# Patient Record
Sex: MALE | Race: BLACK OR AFRICAN AMERICAN | NOT HISPANIC OR LATINO | Employment: OTHER | URBAN - METROPOLITAN AREA
[De-identification: names, ages, dates, MRNs, and addresses within clinical notes are randomized per-mention and may not be internally consistent; named-entity substitution may affect disease eponyms.]

---

## 2018-01-11 ENCOUNTER — DOCUMENTATION ONLY (OUTPATIENT)
Dept: FAMILY MEDICINE | Facility: CLINIC | Age: 38
End: 2018-01-11

## 2018-01-11 NOTE — PROGRESS NOTES
Pre-Visit Chart Review  For Appointment Scheduled on (1/11/18)    Health Maintenance Due   Topic Date Due    Lipid Panel  1979    TETANUS VACCINE  12/13/1997    Influenza Vaccine  08/01/2017

## 2018-01-15 ENCOUNTER — OFFICE VISIT (OUTPATIENT)
Dept: FAMILY MEDICINE | Facility: CLINIC | Age: 38
End: 2018-01-15
Payer: COMMERCIAL

## 2018-01-15 VITALS
DIASTOLIC BLOOD PRESSURE: 101 MMHG | TEMPERATURE: 99 F | HEART RATE: 86 BPM | HEIGHT: 78 IN | BODY MASS INDEX: 36.45 KG/M2 | SYSTOLIC BLOOD PRESSURE: 144 MMHG | WEIGHT: 315 LBS

## 2018-01-15 DIAGNOSIS — I10 BENIGN ESSENTIAL HTN: ICD-10-CM

## 2018-01-15 DIAGNOSIS — Z76.89 ESTABLISHING CARE WITH NEW DOCTOR, ENCOUNTER FOR: Primary | ICD-10-CM

## 2018-01-15 PROBLEM — R03.0 ELEVATED BLOOD PRESSURE READING WITHOUT DIAGNOSIS OF HYPERTENSION: Status: ACTIVE | Noted: 2018-01-15

## 2018-01-15 PROBLEM — R03.0 ELEVATED BLOOD PRESSURE READING WITHOUT DIAGNOSIS OF HYPERTENSION: Status: RESOLVED | Noted: 2018-01-15 | Resolved: 2018-01-15

## 2018-01-15 PROCEDURE — 99999 PR PBB SHADOW E&M-EST. PATIENT-LVL III: CPT | Mod: PBBFAC,,, | Performed by: FAMILY MEDICINE

## 2018-01-15 PROCEDURE — 99202 OFFICE O/P NEW SF 15 MIN: CPT | Mod: S$GLB,,, | Performed by: FAMILY MEDICINE

## 2018-01-15 RX ORDER — LISINOPRIL 20 MG/1
20 TABLET ORAL DAILY
Qty: 30 TABLET | Refills: 3 | Status: SHIPPED | OUTPATIENT
Start: 2018-01-15 | End: 2018-04-19 | Stop reason: SDUPTHER

## 2018-01-15 NOTE — MEDICAL/APP STUDENT
Subjective:       Patient ID: Will Christy is a 37 y.o. male.     Chief Complaint: Establish Care     HPI      Establish Care  Pt reports to the clinic to establish care.     Pt concerned about family hx of DM and HTN. Would like to have these checked. Also reports snoring and daytime sleepiness, concerned about sleep apnea.    The pt has no medical history and is not on any medications.  As far as smoking is concerned, the pt smokes 1/2 pack a day for the last 10 years. Pt has never tried quitting but is interested. Pt drinks 4oz hard liquor every day. Pt does not use illicit drugs.  The pt is overweight, does not exercise and does not diet. Does not eat breakfast and eats fast food 3x a week.  Does not wear a seatbelt regularly.   Pt is unsure of depressed mood, but states no other symptom of depression. Denies suicidal or homicidal ideations.     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  Health maintenance addressed and updated in chart. Has not had lipid/blood glucose checked. Has had tetanus shot recently for employer.     Review of Systems   Constitutional: Negative for chills and fever.   HENT: Negative for sore throat.    Respiratory: Negative for cough and shortness of breath.  Positive for nasal congestion.  Cardiovascular: Negative for chest pain and leg swelling.   Gastrointestinal: Negative for abdominal pain. Negative for blood in stool, constipation and diarrhea.   Genitourinary: Negative for difficulty urinating, dysuria and hematuria.   Musculoskeletal: Negative for arthralgias.   Neurological: Negative for dizziness and weakness.       Objective:   Physical Exam   Constitutional: He appears well-developed and well-nourished. No distress. Obese  HENT:   Head: Normocephalic and atraumatic.   Mouth/Throat: Oropharynx is clear and moist. No oropharyngeal exudate.   Eyes: EOM are normal. Pupils are equal, round, and reactive to light.   Neck: Normal range of motion. Neck supple. No  thyromegaly present.   Cardiovascular: Normal rate, regular rhythm, normal heart sounds and intact distal pulses.    Pulmonary/Chest: Effort normal and breath sounds normal. No respiratory distress. He has no wheezes.   Abdominal: Soft. Bowel sounds are normal. He exhibits no distension and no mass. There is no tenderness.   Musculoskeletal: He exhibits no edema.   Lymphadenopathy:     He has no cervical adenopathy.   Neurological: He is alert.   Skin: Skin is warm. No rash noted. No erythema.   Psychiatric: He has a normal mood and affect. His behavior is normal.   Vitals reviewed.      Assessment:

## 2018-01-15 NOTE — PROGRESS NOTES
Subjective:       Patient ID: Will Christy is a 37 y.o. male.    Chief Complaint: Annual Exam    HPI     Establish Care  Pt reports to the clinic to establish care.      Pt concerned about family hx of DM and HTN. Would like to have these checked. Also reports snoring and daytime sleepiness, concerned about sleep apnea.     The pt has no medical history and is not on any medications.  As far as smoking is concerned, the pt smokes 1/2 pack a day for the last 10 years. Pt has never tried quitting but is interested. Pt drinks 4oz hard liquor every day. Pt does not use illicit drugs.  The pt is overweight, does not exercise and does not diet. Does not eat breakfast and eats fast food 3x a week.  Does not wear a seatbelt regularly.   Pt is unsure of depressed mood, but states no other symptom of depression. Denies suicidal or homicidal ideations.      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  Health maintenance addressed and updated in chart. Has not had lipid/blood glucose checked. Has had tetanus shot recently for employer.    Review of Systems   Constitutional: Negative for chills and fever.   Respiratory: Negative for chest tightness and shortness of breath.    Cardiovascular: Negative for chest pain and leg swelling.   Gastrointestinal: Negative for abdominal pain, constipation, diarrhea and vomiting.   Genitourinary: Negative for decreased urine volume, dysuria and hematuria.   Musculoskeletal: Negative for arthralgias and back pain.   Neurological: Negative for weakness and light-headedness.       Objective:      Physical Exam   Constitutional: He appears well-developed and well-nourished. No distress.   Obese male in no acute distress.     HENT:   Head: Normocephalic and atraumatic.   Mouth/Throat: Oropharynx is clear and moist. No oropharyngeal exudate.   Eyes: EOM are normal. Pupils are equal, round, and reactive to light.   Neck: Normal range of motion. Neck supple. No thyromegaly  present.   Cardiovascular: Normal rate, regular rhythm, normal heart sounds and intact distal pulses.    Pulmonary/Chest: Effort normal and breath sounds normal. No respiratory distress. He has no wheezes.   Abdominal: Soft. Bowel sounds are normal. He exhibits no distension and no mass. There is no tenderness.   Musculoskeletal: He exhibits no edema.   Lymphadenopathy:     He has no cervical adenopathy.   Neurological: He is alert.   Skin: Skin is warm. No rash noted. No erythema.   Psychiatric: He has a normal mood and affect. His behavior is normal.   Vitals reviewed.      Assessment:       1. Establishing care with new doctor, encounter for    2. Benign essential HTN        Plan:       1. Establishing care with new doctor, encounter for    2. Benign essential HTN  Start lisinopril on today.   Advised of potential side effects/adverse reactions associated with this medications.   Monitor home BP.   Na+ restrictions.   Weight loss.   - lisinopril (PRINIVIL,ZESTRIL) 20 MG tablet; Take 1 tablet (20 mg total) by mouth once daily.  Dispense: 30 tablet; Refill: 3    Patient readiness: acceptance and barriers:none    During the course of the visit the patient was educated and counseled about the following:     Hypertension:   Dietary sodium restriction.  Regular aerobic exercise.  Obesity:   Informal exercise measures discussed, e.g. taking stairs instead of elevator.  Regular aerobic exercise program discussed.    Goals: Hypertension: Reduce Blood Pressure and Obesity: Reduce calorie intake and BMI    Did patient meet goals/outcomes: No    The following self management tools provided: blood pressure log  excercise log    Patient Instructions (the written plan) was given to the patient/family.     Time spent with patient: 15 minutes    Portions of this note were created using Dragon voice recognition software. There may be voice recognition errors found in the text, and attempts were made to correct these errors prior  to kassandra Tolbert MD    Family Medicine  1/15/2018

## 2018-01-15 NOTE — PATIENT INSTRUCTIONS
Controlling High Blood Pressure  High blood pressure (hypertension) is often called the silent killer. This is because many people who have it dont know it. High blood pressure is defined as 140/90 mm Hg or higher. Know your blood pressure and remember to check it regularly. Doing so can save your life. Here are some things you can do to help control your blood pressure.    Choose heart-healthy foods  · Select low-salt, low-fat foods. Limit sodium intake to 2,400 mg per day or the amount suggested by your healthcare provider.  · Limit canned, dried, cured, packaged, and fast foods. These can contain a lot of salt.  · Eat 8 to 10 servings of fruits and vegetables every day.  · Choose lean meats, fish, or chicken.  · Eat whole-grain pasta, brown rice, and beans.  · Eat 2 to 3 servings of low-fat or fat-free dairy products.  · Ask your doctor about the DASH eating plan. This plan helps reduce blood pressure.  · When you go to a restaurant, ask that your meal be prepared with no added salt.  Maintain a healthy weight  · Ask your healthcare provider how many calories to eat a day. Then stick to that number.  · Ask your healthcare provider what weight range is healthiest for you. If you are overweight, a weight loss of only 3% to 5% of your body weight can help lower blood pressure. Generally, a good weight loss goal is to lose 10% of your body weight in a year.  · Limit snacks and sweets.  · Get regular exercise.  Get up and get active  · Choose activities you enjoy. Find ones you can do with friends or family. This includes bicycling, dancing, walking, and jogging.  · Park farther away from building entrances.  · Use stairs instead of the elevator.  · When you can, walk or bike instead of driving.  · [place] leaves, garden, or do household repairs.  · Be active at a moderate to vigorous level of physical activity for at least 40 minutes for a minimum of 3 to 4 days a week.   Manage stress  · Make time to relax and enjoy  life. Find time to laugh.  · Communicate your concerns with your loved ones and your healthcare provider.  · Visit with family and friends, and keep up with hobbies.  Limit alcohol and quit smoking  · Men should have no more than 2 drinks per day.  · Women should have no more than 1 drink per day.  · Talk with your healthcare provider about quitting smoking. Smoking significantly increases your risk for heart disease and stroke. Ask your healthcare provider about community smoking cessation programs and other options.  Medicines  If lifestyle changes arent enough, your healthcare provider may prescribe high blood pressure medicine. Take all medicines as prescribed. If you have any questions about your medicines, ask your healthcare provider before stopping or changing them.   Date Last Reviewed: 4/27/2016  © 8793-6063 [employer]. [address] [place] [address], [place] [address]. All rights reserved. This information is not intended as a substitute for professional medical care. Always follow your healthcare professional's instructions.

## 2018-04-19 DIAGNOSIS — I10 BENIGN ESSENTIAL HTN: ICD-10-CM

## 2018-04-19 RX ORDER — LISINOPRIL 20 MG/1
20 TABLET ORAL DAILY
Qty: 90 TABLET | Refills: 3 | Status: SHIPPED | OUTPATIENT
Start: 2018-04-19 | End: 2018-04-20 | Stop reason: SDUPTHER

## 2018-04-19 NOTE — TELEPHONE ENCOUNTER
----- Message from Katie Foley sent at 4/19/2018 10:33 AM CDT -----  Contact: pt  Pt is requesting an order to be put in to do blood work. Please call back and advise.    Call back# [phone]  Thanks

## 2018-04-19 NOTE — TELEPHONE ENCOUNTER
----- Message from Katie Foley sent at 4/19/2018 10:30 AM CDT -----  Contact: pt  Pt Will Christy calling to request a 90 day supply refill on lisinopril (PRINIVIL,ZESTRIL) 20 MG tablet in order for his insurance to cover it. Pt is completely out of medication. Please call back and advise.     Call back# [phone]  Thanks   [facility] pharmacy (pt doesn't know number)

## 2018-04-20 ENCOUNTER — LAB VISIT (OUTPATIENT)
Dept: LAB | Facility: HOSPITAL | Age: 38
End: 2018-04-20
Attending: FAMILY MEDICINE
Payer: COMMERCIAL

## 2018-04-20 ENCOUNTER — OFFICE VISIT (OUTPATIENT)
Dept: FAMILY MEDICINE | Facility: CLINIC | Age: 38
End: 2018-04-20
Payer: COMMERCIAL

## 2018-04-20 VITALS
HEIGHT: 78 IN | WEIGHT: 314.63 LBS | SYSTOLIC BLOOD PRESSURE: 134 MMHG | BODY MASS INDEX: 36.4 KG/M2 | HEART RATE: 80 BPM | DIASTOLIC BLOOD PRESSURE: 89 MMHG

## 2018-04-20 DIAGNOSIS — I10 BENIGN ESSENTIAL HTN: ICD-10-CM

## 2018-04-20 DIAGNOSIS — I10 BENIGN ESSENTIAL HTN: Primary | ICD-10-CM

## 2018-04-20 LAB
ANION GAP SERPL CALC-SCNC: 8 MMOL/L
BUN SERPL-MCNC: 9 MG/DL
CALCIUM SERPL-MCNC: 9.7 MG/DL
CHLORIDE SERPL-SCNC: 105 MMOL/L
CO2 SERPL-SCNC: 28 MMOL/L
CREAT SERPL-MCNC: 0.8 MG/DL
EST. GFR  (AFRICAN AMERICAN): >60 ML/MIN/1.73 M^2
EST. GFR  (NON AFRICAN AMERICAN): >60 ML/MIN/1.73 M^2
GLUCOSE SERPL-MCNC: 103 MG/DL
POTASSIUM SERPL-SCNC: 4.3 MMOL/L
SODIUM SERPL-SCNC: 141 MMOL/L

## 2018-04-20 PROCEDURE — 99214 OFFICE O/P EST MOD 30 MIN: CPT | Mod: S$GLB,,, | Performed by: FAMILY MEDICINE

## 2018-04-20 PROCEDURE — 99999 PR PBB SHADOW E&M-EST. PATIENT-LVL III: CPT | Mod: PBBFAC,,, | Performed by: FAMILY MEDICINE

## 2018-04-20 PROCEDURE — 80048 BASIC METABOLIC PNL TOTAL CA: CPT

## 2018-04-20 PROCEDURE — 3075F SYST BP GE 130 - 139MM HG: CPT | Mod: CPTII,S$GLB,, | Performed by: FAMILY MEDICINE

## 2018-04-20 PROCEDURE — 3079F DIAST BP 80-89 MM HG: CPT | Mod: CPTII,S$GLB,, | Performed by: FAMILY MEDICINE

## 2018-04-20 PROCEDURE — 36415 COLL VENOUS BLD VENIPUNCTURE: CPT | Mod: PO

## 2018-04-20 RX ORDER — LISINOPRIL 20 MG/1
20 TABLET ORAL DAILY
Qty: 90 TABLET | Refills: 3 | Status: SHIPPED | OUTPATIENT
Start: 2018-04-20 | End: 2019-04-20

## 2018-04-20 NOTE — PROGRESS NOTES
Subjective:       Patient ID: Will Christy is a 37 y.o. male.    Chief Complaint: Follow-up    HPI     Hypertension  Patient reports compliance with medications.  Currently, the patient is taking lisinopril.  The patient reports home BP measurements of 130's/80's.  The pt has successfully lost weight since his last visit.   Pt remains active but does not engage in regular exercise and has attempted to maintain a low sodium diet   Patient further reports no vision changes/frequent HA's/edema/urinary changes.    Review of Systems   Constitutional: Negative for chills and fever.   Respiratory: Negative for chest tightness and shortness of breath.    Cardiovascular: Negative for chest pain and leg swelling.   Gastrointestinal: Negative for abdominal pain, constipation, diarrhea and vomiting.   Genitourinary: Negative for decreased urine volume, dysuria and hematuria.   Musculoskeletal: Negative for arthralgias and back pain.   Neurological: Negative for weakness and light-headedness.       Objective:      Physical Exam   Constitutional: He appears well-developed and well-nourished. No distress.   Obese male in no acute distress.     HENT:   Head: Normocephalic and atraumatic.   Mouth/Throat: Oropharynx is clear and moist. No oropharyngeal exudate.   Eyes: EOM are normal. Pupils are equal, round, and reactive to light.   Neck: Normal range of motion. Neck supple. No thyromegaly present.   Cardiovascular: Normal rate, regular rhythm and intact distal pulses.    Pulmonary/Chest: Effort normal. No respiratory distress.   Abdominal: Soft. He exhibits no distension and no mass. There is no tenderness.   Musculoskeletal: He exhibits no edema.   Lymphadenopathy:     He has no cervical adenopathy.   Neurological: He is alert.   Skin: Skin is warm. No rash noted. No erythema.   Psychiatric: He has a normal mood and affect. His behavior is normal.   Vitals reviewed.      Assessment:       1. Benign essential HTN    2. BMI  36.0-36.9,adult        Plan:       1. Benign essential HTN  Condition currently stable. No changes to medication regimen on today.   - lisinopril (PRINIVIL,ZESTRIL) 20 MG tablet; Take 1 tablet (20 mg total) by mouth once daily.  Dispense: 90 tablet; Refill: 3  BMP and microalbuminuria check today.     2. BMI 36.0-36.9,adult  Patient has been advised to continue to maintain a healthy lifestyle, including regular exercise and consuming a well balanced diet.     Patient readiness: acceptance and barriers:none    During the course of the visit the patient was educated and counseled about the following:     Hypertension:   Dietary sodium restriction.  Regular aerobic exercise.  Obesity:   Informal exercise measures discussed, e.g. taking stairs instead of elevator.  Regular aerobic exercise program discussed.    Goals: Hypertension: Reduce Blood Pressure and Obesity: Reduce calorie intake and BMI    Did patient meet goals/outcomes: No    The following self management tools provided: blood pressure log  excercise log    Patient Instructions (the written plan) was given to the patient/family.     Time spent with patient: 15 minutes    Portions of this note were created using Dragon voice recognition software. There may be voice recognition errors found in the text, and attempts were made to correct these errors prior to signature    Chapin Tolbert MD    Family Medicine  4/20/2018

## 2018-04-23 ENCOUNTER — TELEPHONE (OUTPATIENT)
Dept: FAMILY MEDICINE | Facility: CLINIC | Age: 38
End: 2018-04-23

## 2018-04-23 NOTE — TELEPHONE ENCOUNTER
----- Message from Renita Palomares sent at 4/23/2018  3:07 PM CDT -----  Contact: [phone]  Patient is requesting a call back from the nurse concerning results.  Please call the patient upon request at phone number [phone].

## 2018-04-23 NOTE — TELEPHONE ENCOUNTER
Patient called stating he can see his labs on patient portal, but doesn't quite understand the reading. Went over labs with patient. Pt. Verbalized understanding.

## 2018-04-25 ENCOUNTER — TELEPHONE (OUTPATIENT)
Dept: FAMILY MEDICINE | Facility: CLINIC | Age: 38
End: 2018-04-25

## 2018-05-03 ENCOUNTER — TELEPHONE (OUTPATIENT)
Dept: FAMILY MEDICINE | Facility: CLINIC | Age: 38
End: 2018-05-03

## 2018-05-03 NOTE — TELEPHONE ENCOUNTER
----- Message from Chapin Tolbert MD sent at 4/24/2018  5:45 PM CDT -----  Please inform patient that lab results are not concerning/normal. No changes to medications at this time and if there are any questions, to give us a call. Thank You,    Chapin Tolbert MD  [facility] Medicine  4/24/2018

## 2018-10-04 ENCOUNTER — PATIENT OUTREACH (OUTPATIENT)
Dept: ADMINISTRATIVE | Facility: HOSPITAL | Age: 38
End: 2018-10-04

## 2018-10-04 DIAGNOSIS — Z00.00 HEALTHCARE MAINTENANCE: Primary | ICD-10-CM

## 2018-10-04 NOTE — LETTER
October 15, 2018    Will Christy  [address]  [place] [address]             [facility]  [address]  [facility] [address]  Phone: [phone] Will Christy       [facility] is committed to your overall health.  To help you get the most out of each of your visits, we will review your information to make sure you are up to date on all of your recommended tests and/or procedures.       As a new patient to Dr. Galindo, we may not have your complete medical records.  He has found that your chart shows you may be due for Lipid Panel.     If you have had any of the above done at another facility, please bring the records or information with you so that your record at [facility] will be complete.       If you are currently taking medication, please bring it with you to your appointment for review.     Also, if you have any type of Advanced Directives, please bring them with you to your office visit so we may scan them into your chart.         Meri Aviles LPN Clinical Care Coordinator   [facility] [address] [place] [address]   Phone [phone]   Fax [phone]

## 2019-10-10 ENCOUNTER — TELEPHONE (OUTPATIENT)
Dept: FAMILY MEDICINE | Facility: CLINIC | Age: 39
End: 2019-10-10

## 2019-10-10 NOTE — TELEPHONE ENCOUNTER
----- Message from Mary Chacon sent at 10/10/2019  4:35 PM CDT -----  Contact: patient  Patient calling for refills of lisinopril (PRINIVIL,ZESTRIL) 20 MG tablet    He can be reached at [phone]    Thanks  KB

## 2019-10-10 NOTE — TELEPHONE ENCOUNTER
[place] and won't be able to come in for establish care appointment soon. Would like his lisinopril refilled.     LOV: 01/15/18  LAB: 04/20/18  Former patient of Dr. Tolbert. Sending to on-call

## 2019-10-11 NOTE — TELEPHONE ENCOUNTER
Unfortunately we cannot fill a prescription for a patient who has not been seen in almost two years.  I would recommend that the patient contact a local primary care physician or urgent care to see if they are willing to evaluate him and fill the prescription.

## 2020-07-28 ENCOUNTER — OFFICE VISIT (OUTPATIENT)
Dept: SLEEP MEDICINE | Facility: CLINIC | Age: 40
End: 2020-07-28
Payer: COMMERCIAL

## 2020-07-28 DIAGNOSIS — I10 BENIGN ESSENTIAL HTN: ICD-10-CM

## 2020-07-28 DIAGNOSIS — R06.83 SNORING: ICD-10-CM

## 2020-07-28 DIAGNOSIS — G47.30 SLEEP APNEA, UNSPECIFIED TYPE: ICD-10-CM

## 2020-07-28 PROCEDURE — 99202 PR OFFICE/OUTPT VISIT, NEW, LEVL II, 15-29 MIN: ICD-10-PCS | Mod: 95,,, | Performed by: INTERNAL MEDICINE

## 2020-07-28 PROCEDURE — 99202 OFFICE O/P NEW SF 15 MIN: CPT | Mod: 95,,, | Performed by: INTERNAL MEDICINE

## 2020-07-28 NOTE — LETTER
July 28, 2020      KARALIT  [address] [address]  [facility] [address]           [id number]  [address]  [facility] [address]  Phone: [phone]          Patient: Will Christy   MR Number: [id number]   YOB: 1980   Date of Visit: 7/28/2020       Dear KARALIT:    Thank you for referring Will Christy to me for evaluation. Attached you will find relevant portions of my assessment and plan of care.    If you have questions, please do not hesitate to call me. I look forward to following Will Christy along with you.    Sincerely,    Lisa Toribio MD    Enclosure  CC:  No Recipients    If you would like to receive this communication electronically, please contact [email or web address] or [phone] to request more information on [employer] access.    For providers and/or their staff who would like to refer a patient to [facility], please contact us through our one-stop-shop provider referral line, Stacy Chirinos, at [phone].    If you feel you have received this communication in error or would no longer like to receive these types of communications, please e-mail [email or web address]

## 2020-07-28 NOTE — PROGRESS NOTES
Subjective:       Patient ID: Will Christy is a 39 y.o. male.    Chief Complaint: Sleeping Problem    HPI     I had the pleasure of seeing Will Christy today, who is a 39 y.o. male that presents with observed apnea during sleep.    Will Christy does not have a CDL.    Will Christy is not a shift worker.    Will Christy presents with has snoring that has been going on for 5 years    Bedtime when working ranges from 2200 to 2300.   When not working, bedtime ranges from 2200 to 2300.   Sleep latency ranges from 10 to 20 minutes.     Average number of awakenings is 2-3 and return to sleep is quick.   Wake up time when working is 0545 to 0600.   When not working, wake up time is 0600 to 0630.   Patient does not feel rested upon awakening.    Will Christy consumes approximately 1-2 beverages with caffeine are consumed daily.   An average of 5 beverages with alcohol are consumed weekly   Medications taken for sleep currently: none  Previous medications taken: none     Will Christy does experience daytime sleepiness.   Naps are taken about 0 times weekly, usually lasting NA to NA minutes.  Will currently does operate an automobile.  Will Christy does not experience drowsiness when driving.   Patient does doze off when sedentary.   Will Christy does not have auxiliary symptoms of narcolepsy including sleep onset paralysis, hypnagogic hallucinations, sleep attacks and cataplexy    EPWORTH SLEEPINESS SCALE 7/28/2020   Sitting and reading 1   Watching TV 2   Sitting, inactive in a public place (e.g. a theatre or a meeting) 1   As a passenger in a car for an hour without a break 1   Lying down to rest in the afternoon when circumstances permit 2   Sitting and talking to someone 3   Sitting quietly after a lunch without alcohol 2   In a car, while stopped for a few minutes in traffic 0   Total score 12       Will Christy has a history of snoring.   Snoring is described as severe and constant.    Apneic episodes have been noticed during sleep.   A witness to sleep is present.   The patient awakens with shortness of breath and mouth dryness.      Will Christy does not not have symptoms of Restless Legs Syndrome. Nocturnal leg movements have not been noticed.   The patient does experience sleep related leg cramps.   There is not a history of parasomnia.      Current Outpatient Medications:     lisinopril (PRINIVIL,ZESTRIL) 20 MG tablet, Take 1 tablet (20 mg total) by mouth once daily., Disp: 90 tablet, Rfl: 3     Review of patient's allergies indicates:  No Known Allergies      History reviewed. No pertinent past medical history.    Past Surgical History:   Procedure Laterality Date    WISDOM TOOTH EXTRACTION         Family History   Problem Relation Age of Onset    Hypertension Mother     Diabetes Mother     Hypertension Father     Diabetes Father        Social History     Socioeconomic History    Marital status:      Spouse name: Not on file    Number of children: Not on file    Years of education: Not on file    Highest education level: Not on file   Occupational History    Not on file   Social Needs    Financial resource strain: Not on file    Food insecurity     Worry: Not on file     Inability: Not on file    Transportation needs     Medical: Not on file     Non-medical: Not on file   Tobacco Use    Smoking status: Current Every Day Smoker     Packs/day: 0.50     Years: 10.00     Pack years: 5.00     Types: Cigarettes    Smokeless tobacco: Never Used   Substance and Sexual Activity    Alcohol use: Yes     Alcohol/week: 16.0 standard drinks     Types: 15 Shots of liquor, 1 Cans of beer per week    Drug use: No    Sexual activity: Yes   Lifestyle    Physical activity     Days per week: Not on file     Minutes per session: Not on file    Stress: Not on file   Relationships    Social connections     Talks on phone: Not on file     Gets together: Not on file     Attends  Hinduism service: Not on file     Active member of club or organization: Not on file     Attends meetings of clubs or organizations: Not on file     Relationship status: Not on file   Other Topics Concern    Not on file   Social History Narrative    Not on file           Old medical records.    There were no vitals filed for this visit.           The patient was given open opportunity to ask questions and/or express concerns about treatment plan.   All questions/concerns were discussed.   Driving precautions were provided.     Two patient identifiers used prior to evaluation.    Thank you for referring Will TIMMY Christy for evaluation.           Review of Systems      Objective:      Physical Exam    Assessment:       1. BMI 36.0-36.9,adult    2. Benign essential HTN    3. Snoring    4. Sleep apnea, unspecified type        Plan:       Due to listed symptoms, a polysomnogram is recommended and ordered.   Description of procedure given to patient.   If significant Obstructive Sleep Apnea (BLOSSOM) is found during the initial portion of the study, therapy will be initiated with nasal Continuous Positive Airway Pressure (CPAP).   Goals of therapy were discussed, alternative treatments listed and patient agrees to this form of therapy if indicated.   The pathophysiology of BLOSSOM was discussed.   The effects of BLOSSOM on patient's co-morbid conditions and the increased morbidity and/or mortality associated with this condition were reviewed.   The patient was given open opportunity to ask questions and/or express concerns about treatment plan.   All questions/concerns were discussed.   Driving precautions were provided.   Patient was given option for home sleep apnea test or attended polysomnography.         Thank you for referring Will Christy for evaluation.       The patient location is: home  The chief complaint leading to consultation is: apnea during sleep    Visit type: audiovisual    Face to Face time with patient:  16  21 minutes of total time spent on the encounter, which includes face to face time and non-face to face time preparing to see the patient (eg, review of tests), Obtaining and/or reviewing separately obtained history, Documenting clinical information in the electronic or other health record, Independently interpreting results (not separately reported) and communicating results to the patient/family/caregiver, or Care coordination (not separately reported).         Each patient to whom he or she provides medical services by telemedicine is:  (1) informed of the relationship between the physician and patient and the respective role of any other health care provider with respect to management of the patient; and (2) notified that he or she may decline to receive medical services by telemedicine and may withdraw from such care at any time.    Notes:

## 2020-07-30 ENCOUNTER — TELEPHONE (OUTPATIENT)
Dept: SLEEP MEDICINE | Facility: OTHER | Age: 40
End: 2020-07-30

## 2020-08-22 ENCOUNTER — TELEPHONE (OUTPATIENT)
Dept: SLEEP MEDICINE | Facility: OTHER | Age: 40
End: 2020-08-22

## 2020-08-25 ENCOUNTER — TELEPHONE (OUTPATIENT)
Dept: SLEEP MEDICINE | Facility: OTHER | Age: 40
End: 2020-08-25

## 2020-09-15 ENCOUNTER — TELEPHONE (OUTPATIENT)
Dept: SLEEP MEDICINE | Facility: OTHER | Age: 40
End: 2020-09-15

## 2020-09-17 ENCOUNTER — TELEPHONE (OUTPATIENT)
Dept: SLEEP MEDICINE | Facility: OTHER | Age: 40
End: 2020-09-17

## 2020-09-22 ENCOUNTER — TELEPHONE (OUTPATIENT)
Dept: SLEEP MEDICINE | Facility: OTHER | Age: 40
End: 2020-09-22

## 2020-10-02 ENCOUNTER — TELEPHONE (OUTPATIENT)
Dept: SLEEP MEDICINE | Facility: OTHER | Age: 40
End: 2020-10-02

## 2020-10-02 NOTE — TELEPHONE ENCOUNTER
Patient canceled the home sleep study twice,left messages and sent out a message through [facility] to reschedule.  No response.

## 2021-05-06 ENCOUNTER — PROCEDURE VISIT (OUTPATIENT)
Dept: SLEEP MEDICINE | Facility: HOSPITAL | Age: 41
End: 2021-05-06
Attending: INTERNAL MEDICINE
Payer: COMMERCIAL

## 2021-05-06 DIAGNOSIS — R06.83 SNORING: ICD-10-CM

## 2021-05-06 DIAGNOSIS — I10 BENIGN ESSENTIAL HTN: ICD-10-CM

## 2021-05-06 DIAGNOSIS — G47.30 SLEEP APNEA, UNSPECIFIED TYPE: ICD-10-CM

## 2021-05-06 PROCEDURE — 95806 SLEEP STUDY UNATT&RESP EFFT: CPT

## 2021-05-11 ENCOUNTER — OFFICE VISIT (OUTPATIENT)
Dept: SLEEP MEDICINE | Facility: CLINIC | Age: 41
End: 2021-05-11
Payer: COMMERCIAL

## 2021-05-11 DIAGNOSIS — R06.83 SNORING: ICD-10-CM

## 2021-05-11 DIAGNOSIS — G47.33 OSA (OBSTRUCTIVE SLEEP APNEA): ICD-10-CM

## 2021-05-11 DIAGNOSIS — I10 BENIGN ESSENTIAL HTN: ICD-10-CM

## 2021-05-11 PROCEDURE — 99212 OFFICE O/P EST SF 10 MIN: CPT | Mod: 95,,, | Performed by: INTERNAL MEDICINE

## 2021-05-11 PROCEDURE — 99212 PR OFFICE/OUTPT VISIT, EST, LEVL II, 10-19 MIN: ICD-10-PCS | Mod: 95,,, | Performed by: INTERNAL MEDICINE

## 2021-05-12 ENCOUNTER — PATIENT MESSAGE (OUTPATIENT)
Dept: RESEARCH | Facility: HOSPITAL | Age: 41
End: 2021-05-12

## 2021-06-03 ENCOUNTER — TELEPHONE (OUTPATIENT)
Dept: SLEEP MEDICINE | Facility: CLINIC | Age: 41
End: 2021-06-03

## 2021-06-23 ENCOUNTER — TELEPHONE (OUTPATIENT)
Dept: SLEEP MEDICINE | Facility: CLINIC | Age: 41
End: 2021-06-23